# Patient Record
Sex: FEMALE | Race: WHITE
[De-identification: names, ages, dates, MRNs, and addresses within clinical notes are randomized per-mention and may not be internally consistent; named-entity substitution may affect disease eponyms.]

---

## 2018-10-30 ENCOUNTER — HOSPITAL ENCOUNTER (OUTPATIENT)
Dept: HOSPITAL 80 - BMCIMAGING | Age: 23
End: 2018-10-30
Attending: FAMILY MEDICINE
Payer: COMMERCIAL

## 2018-10-30 DIAGNOSIS — S62.322A: Primary | ICD-10-CM
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
## 2018-11-04 ENCOUNTER — HOSPITAL ENCOUNTER (OUTPATIENT)
Dept: HOSPITAL 80 - FED | Age: 23
Setting detail: OBSERVATION
LOS: 1 days | Discharge: HOME | End: 2018-11-05
Attending: FAMILY MEDICINE | Admitting: FAMILY MEDICINE
Payer: COMMERCIAL

## 2018-11-04 DIAGNOSIS — S62.302D: ICD-10-CM

## 2018-11-04 DIAGNOSIS — F41.9: ICD-10-CM

## 2018-11-04 DIAGNOSIS — F98.8: ICD-10-CM

## 2018-11-04 DIAGNOSIS — F17.200: ICD-10-CM

## 2018-11-04 DIAGNOSIS — R21: Primary | ICD-10-CM

## 2018-11-04 DIAGNOSIS — M79.641: ICD-10-CM

## 2018-11-04 PROCEDURE — G0378 HOSPITAL OBSERVATION PER HR: HCPCS

## 2018-11-04 PROCEDURE — 96374 THER/PROPH/DIAG INJ IV PUSH: CPT

## 2018-11-04 PROCEDURE — 73130 X-RAY EXAM OF HAND: CPT

## 2018-11-04 PROCEDURE — 96375 TX/PRO/DX INJ NEW DRUG ADDON: CPT

## 2018-11-04 PROCEDURE — 99285 EMERGENCY DEPT VISIT HI MDM: CPT

## 2018-11-04 NOTE — EDPHY
H & P


Stated Complaint: blisters on right hand- surgery 11/2/18


Time Seen by Provider: 11/04/18 23:37


HPI/ROS: 





HPI





CHIEF COMPLAINT:  Right hand swelling, pain, blisters, redness.





HISTORY OF PRESENT ILLNESS:  23-year-old female, had a recent right hand 

fracture.  3rd metacarpal was fractured.  On Friday she had outpatient surgery 

of this by Dr. Vanegas.  A pin was placed.  She noticed around 2:00 a.m. This 

afternoon she had notice increasing right hand swelling and redness.  This 

progressed this evening.  She now has rather large bullae blisters with yellow 

clear fluid present.  No particular purpura.  The hand is swollen, warm, red.  

Good cap refill.  Good distal pulse.





Past Medical History:  Denies medical history





Past Surgical History:ORIF right 3rd metacarpal on friday Dr. Vanegas.





Social History:  Denies drugs alcohol tobacco.





Family History:  Noncontributory








ROS   


REVIEW OF SYSTEMS:


10 Systems were reviewed and negative with the exception of the elements 

mentioned in the history of present illness.








Exam   


Constitutional   triage nursing summary reviewed, vital signs reviewed, awake/

alert. 


Eyes   normal conjunctivae and sclera, EOMI, PERRLA. 


HENT   normal inspection, atraumatic, moist mucus membranes, no epistaxis, neck 

supple/ no meningismus, no raccoon eyes. 


Respiratory   clear to auscultation bilaterally, normal breath sounds, no 

respiratory distress, no wheezing. 


Cardiovascular   rate normal, regular rhythm, no murmur, no edema, distal 

pulses normal. 


Gastrointestinal   soft, non-tender, no rebound, no guarding, normal bowel 

sounds, no distension, no pulsatile mass. 


Genitourinary   no CVA tenderness. 


Musculoskeletal right hand:  Good radial pulse, good cap refill however the 

hand is swollen diffusely, erythematous, large blisters present on the dorsum 

of the hand.  Steri-Strips in place.  Warmth and redness present.  Concern for 

infection versus allergic reaction.  The patient hand on exam is swollen, there 

approximately 3 blisters 2 in very large size, and  1 small towards the 

proximal wrist.  Surrounding erythema with a very well demarcated edge.  No 

crepitus.  She is able to move her fingers appropriately.  Without any 

significant pain there is no significant pain when I press on her hand.  No 

compartment syndrome.  Patient does not have any forearm pain or crepitus.





  no midline vertebral tenderness, full range of motion, no calf swelling, no 

tenderness of extremities, no meningismus, good pulses, neurovascularly intact.


Skin  please see above without right hand.


Neurologic   awake, alert and oriented x 3, AAOx3, moves all 4 extremities 

equally, motor intact, sensory intact, CN II-XII intact, normal cerebellar, 

normal vision, normal speech. 


Psychiatric   normal mood/affect. 


Heme/Lymph/Immune   no lymphadenopathy.





Differential Diagnosis:  Includes but is not limited to in a particular order 

hand infection, deep space hand infection, hardware infection, sepsis, 

bacteremia, necrotizing fasciitis, erysipelas, allergic reaction





Medical Decision Making:  Plan for this patient IV establishment with blood 

draw blood cultures inflammatory markers, IV Ancef 2 g, x-ray right hand.  Re-

evaluate.  I will also contact Dr. Vanegas.








Re-evaluation:








2349: consulting Dr. Vanegas. 





2359:  Spoke with Dr. Vanegas.  Reviewing the case.





1206AM: Spoke with Dr. Vanegas, reviewed Case, sent pictures to Dr. Vanegas. He 

reviewed the pictures. Would like patient admitted. NPO overnight, observed, 

possible wash-out. Allergic Reaction vs. Infection. 


Extensive discussion with Dr. Vanegas.  He would like the patient admitted to the 

hospitalist service.  Observe overnight.  He believes to be a allergic reaction 

less likely infection agrees with current plan of management this time blood 

work, blood cultures, Ancef, observation.  He did not believe the patient needs 

washout this time.  Feels that the surgery was done Friday and this would be a 

very quick presentation of possible infection.





1228AM:  Spoke with carleen Maier, with ID, he reviewed the photographs of her 

hand.  Unclear if this is allergic reaction versus infection.  He agrees with 

current management plan of blood work, blood cultures, IV Ancef.  Outlining the 

area.  Admission.  Will plan on consulting on her.





Additionally have remove the Steri-Strips to make sure there is no drainage or 

pus.





Is possible this is a contact dermatitis or allergic reaction from ChloraPrep 

swab or bupivacaine.





1240AM:  Patient Steri-Strips were removed as per this could possibly be 

causing this as well.  The incision is clean, dry and intact no evidence of 

drainage or pus.





The large blister to the proximal aspect of the wrist did rupture.  Clear to 

yellowish fluid was drained.  I did send a culture from this.





IV Ancef as been given.





Blood work has been reviewed





X-ray the right hand:  Soft tissue swelling.  There is some gas bubbles in the 

soft tissue.





Long discussed with the patient and mom at bedside.  Agree for hospital 

admission.





Here in emergency room is noted the patient is not febrile, she does not have 

an elevated white count, ESR and CRP are normal.  She does not have significant 

pain on exam.





Plan for admission obvious.  Possible allergic reaction versus infection.





Ancef 2 g Q 8 hr.





Infectious disease have been consult





Hand surgery as been consult.





Discussed case in detail with Dr. Vanegas. 


Source: Patient





- Personal History


LMP (Females 10-55): IUD In Place


Current Tetanus Diphtheria and Acellular Pertussis (TDAP): Yes


Tetanus Vaccine Date: WITHIN 10 YRS





- Medical/Surgical History


Hx Asthma: No


Hx Chronic Respiratory Disease: No


Hx Diabetes: No


Hx Cardiac Disease: No


Hx Renal Disease: No


Hx Cirrhosis: No


Hx Alcoholism: No


Hx HIV/AIDS: No


Hx Splenectomy or Spleen Trauma: No


Other PMH: med hx-adhd, BIPOLAR, anxiety.  surg-none





- Social History


Smoking Status: Current some day smoker


Constitutional: 


 Initial Vital Signs











Temperature (C)  36.8 C   11/04/18 23:20


 


Heart Rate  83   11/04/18 23:20


 


Respiratory Rate  20   11/04/18 23:20


 


Blood Pressure  111/71   11/04/18 23:20


 


O2 Sat (%)  96   11/04/18 23:20








 











O2 Delivery Mode               Room Air














Allergies/Adverse Reactions: 


 





No Known Allergies Allergy (Verified 11/04/18 23:18)


 








Home Medications: 














 Medication  Instructions  Recorded


 


Amphet Asp and D/Amphet [Adderall 20 mg PO BID@09,14 07/01/14





20 mg (*)]  


 


Doxycycline Hyclate [Vibramycin 100 mg PO BID 11/04/18





100 MG (*)]  


 


Cephalexin [Keflex (*)] 500 mg PO TID #9 cap 11/05/18


 


Ibuprofen [Motrin (*)] 200 mg PO DAILY PRN 11/05/18


 


diphenhydrAMINE [Benadryl 25 MG 25 mg PO HS 11/05/18





(*)]  














Medical Decision Making





- Data Points


Laboratory Results: 


 Laboratory Results





 11/04/18 23:50 





 11/04/18 23:50 








Microbiology Results: 


 MICROBIOLOGY





11/05/18 00:37   Hand - Other   Gram Stain - Final


11/05/18 00:37   Hand - Other   Wound Culture - Preliminary





Medications Given: 


 








Discontinued Medications





Hydrocodone Bitart/Acetaminophen (Norco 5/325)  1 - 2 tab PO Q4HRS PRN


   PRN Reason: Pain, Moderate Able to Take PO


   Stop: 11/15/18 01:10


   Last Admin: 11/05/18 02:38 Dose:  1 tab


Diphenhydramine HCl (Benadryl Injection)  25 mg IVP ONCE ONE


   Stop: 11/05/18 00:50


   Last Admin: 11/05/18 00:56 Dose:  25 mg


Hydromorphone HCl (Dilaudid)  0.5 mg IVP EDNOW ONE


   Stop: 11/04/18 23:43


   Last Admin: 11/04/18 23:59 Dose:  0.5 mg


Cefazolin Sodium/Dextrose (Ancef)  100 mls @ 200 mls/hr IV EDNOW ONE


   PRN Reason: Protocol


   Stop: 11/05/18 00:12


   Last Admin: 11/05/18 00:00 Dose:  100 mls


Sodium Chloride (Ns)  1,000 mls @ 0 mls/hr IV EDNOW ONE; Wide Open


   PRN Reason: Protocol


   Stop: 11/04/18 23:43


   Last Admin: 11/05/18 00:00 Dose:  1,000 mls


Sodium Chloride (Ns)  1,000 mls @ 75 mls/hr IV CONT EDVIN


   Stop: 05/04/19 01:14


   Last Admin: 11/05/18 07:34 Dose:  1,000 mls


Cefazolin Sodium/Dextrose (Ancef)  100 mls @ 200 mls/hr IV Q8HRS EDVIN


   PRN Reason: Protocol


   Stop: 12/05/18 07:59


   Last Admin: 11/05/18 07:34 Dose:  100 mls








Departure





- Departure


Disposition: [facility]


Clinical Impression: 


 Rash and nonspecific skin eruption





Condition: Good

## 2018-11-05 VITALS — DIASTOLIC BLOOD PRESSURE: 55 MMHG | SYSTOLIC BLOOD PRESSURE: 115 MMHG

## 2018-11-05 LAB
INR PPP: 0.89 (ref 0.83–1.16)
PLATELET # BLD: 294 10^3/UL (ref 150–400)
PLATELET # BLD: 322 10^3/UL (ref 150–400)
PROTHROMBIN TIME: 12.3 SEC (ref 12–15)

## 2018-11-05 RX ADMIN — HYDROCODONE BITARTRATE AND ACETAMINOPHEN PRN TAB: 5; 325 TABLET ORAL at 02:38

## 2018-11-05 RX ADMIN — HYDROCODONE BITARTRATE AND ACETAMINOPHEN PRN TAB: 5; 325 TABLET ORAL at 02:03

## 2018-11-05 NOTE — GDS
DISCHARGE DIAGNOSES:  

1.  Rash on right hand with large-appearing bullae, likely contact dermatitis.  

2.  Hand pain, status post a metacarpal open reduction and internal fixation.

3.  Anxiety and attention deficit disorder.



CONSULTATION:  Dr. Tyler St.



HISTORY:  Briefly, the patient is a 23-year-old female with a history of anxiety and ADD, who present
ed to the ER with right hand pain.  She is status post a metacarpal open reduction and internal fixat
ion.  She was seen and evaluated by Dr. Vanegas, had surgery.  Overall, did well with surgery.  Then, she
 had a rash with large-appearing bullae.  It is very localized.  Dr. St and I evaluated her.  He 
is questioning if she had a possible localized reaction to chlorhexidine.  She will follow up with he
r dermatologist in regard to this.  She will be discharged home on a few days of oral antibiotics.



HOSPITAL COURSE:  

1.  Rash with large-appearing bullae, likely contact dermatitis.  Recommendation to get further evalu
ation to see if she had a reaction to chlorhexidine.

2.  Status post metacarpal open reduction and internal fixation.  Further followup with Dr. Vanegas.

3.  Anxiety, ADD, stable.



DISCHARGE CONDITION:  Stable.  Blood pressure is 115/55, O2 sats on room air 96%, respiratory rate of
 20, pulse of 68, temperature 36.8.



DISCHARGE MEDICATIONS:  Please see the EMR.



DISCHARGE INSTRUCTIONS:  

1.  Further followup with her dermatologist and get a patch test to see if she is allergic to chlorhe
xidine.

2.  To follow up with Dr. Vanegas and her PCP for pending labs.  She has several pending.

3.  If she develops fever, chills, worsening hand pain, return to the ER.

4.  Elevate her hand several days above her heart to help with the swelling.



Copy requested to:

Dr. Guilherme St



Job #:  [id number]

## 2018-11-05 NOTE — GCON
INPATIENT INFECTIOUS DISEASE CONSULTATION



REFERRING PHYSICIAN:  Conrado Vanegas MD



REASON FOR REFERRAL:  A blistering erythema postoperatively around the operative site volar aspect th
e right wrist.



HISTORY OF PRESENT ILLNESS:  Patient is a 23-year-old female who suffered a fall on the stairs during
 a Halloween party and this resulted in a fracture of her right hand and wrist.  The patient went to 
surgical repair on 11/02.  She had a fixation and successful operative course.  She presented back to
 the emergency room on 11/05 with increasing erythema on larger bullae blisters on the dorsal aspect 
of her hand.  The hand was swollen, warm, and red.  There were good pulses.  The patient denied any f
odalis or other symptoms of systemic toxicity.  She was started empirically on IV cefazolin.  Today, t
he blisters have evolved and some have ruptured.  There is no significant extension of erythema.  She
 remains afebrile.



PAST MEDICAL HISTORY:  

1.  Attention deficit hyperactivity disorder.

2.  Anxiety.

3.  Possible bipolar disorder although patient states this is a misdiagnosis.



PAST SURGICAL HISTORY:  As above.



ANTIBIOTICS:  Keflex.



ALLERGIES:  No known drug allergies.



SOCIAL HISTORY:  Patient currently is a full-time student and lives with her parents.  She is a [employer] tobacco user, occasional marijuana use, occasional alcohol use as well.



FAMILY HISTORY:  Reviewed, but noncontributory.



REVIEW OF SYSTEMS:  Other than that detailed above in the present illness, comprehensive 10-system re
view is negative.



PHYSICAL EXAMINATION:  VITAL SIGNS:  Temperature maximum 36.8, temperature current 36.6, heart rate 6
8, respiratory rate is 20, blood pressure is 115/55.  GENERAL:  The patient is a well-formed, well-no
urished young female in no acute distress.  She is not toxic in appearance.  She is alert and oriente
d x3.  She is very pleasant in demeanor.  CARDIAC:  Regular rate and rhythm.  No murmur, rub, or gall
op noted.  LUNGS:  Clear to auscultation bilaterally with good effort.  SKIN:  Warm and dry to the to
uch.  No diffuse rash.  On the dorsal aspect of the right hand and wrist, patient has a surgical inci
ronel, which looks well approximated.  There is no drainage from the incision.  The overlying skin to 
the incision surrounding the incision has significant erythema and bullae.  There is sharp demarcatio
n of erythema to normal skin a number of centimeter proximal and distal to the incision area.  There 
is no circumferential stretching of the erythema or blistering to the palmar aspect.



LABORATORY DATA:  Patient has a CBC dated 11/05/2018, shows a white blood cell count of 5.75, hemoglo
bin of 12.0, hematocrit of 36.6, and platelet count of 294, differential is within normal limits.  Se
rum chemistries on 11/05/2018, are all within normal limits.  Creatinine 0.6.  Procalcitonin was unde
tectable low.



MICROBIOLOGIC DATA:  Patient has blood cultures dated 11/04/2018, which are pending.  The patient als
o has an aspirate of the bullae fluid Gram stain and culture, which are pending.



ASSESSMENT:  By physical examination, this looks much more like a contact dermatitic reaction.  The c
andidates as to exposure would be the pre-surgical preparation material, which consist of chlorhexidi
ne.  Other candidates would, include the Steri-Strip adhesives.  I think the chlorhexidine is more li
jonah considering the distance of erythema and blistering from the incision area itself.  There was an
 Ace wrap surrounding the hand, but there is no involvement of skin on the palmar aspect of the area.
  Suggested to the patient that she approach her dermatologist as an outpatient once this is resolved
 with patch testing for chlorhexidine.  In the meantime, I think it is fine to send the patient home 
with oral Keflex for a total of 3 days.  Patient can follow up with primary care and with Dr. Vanegas for
 routine surgical followup.





Job #:  [id number]

## 2018-11-05 NOTE — GHP
DATE OF ADMISSION:  11/05/2018



PRIMARY CARE PHYSICIAN:  None listed.



SOURCE:  Patient provides history, appears reliable.  EMR was reviewed and case 
discussed with ED provider.



CHIEF COMPLAINT:  Right hand pain and swelling.



HISTORY OF PRESENT ILLNESS:  This is a very pleasant 23-year-old female with 
past medical history significant for ADHD, anxiety, who presents to the 
emergency department today with complaints of right hand pain and swelling that 
started in the last 24 hours.  Patient had a fall last week on her hand.  She 
subsequently underwent a 3rd metacarpal fracture ORIF with Dr. Vanegas.  She 
reports development of right hand pain, swelling, and blisters on the dorsum of 
her hand.  She states she was prescribed doxycycline to start taking prior to 
her surgery on Friday, but she forgot and started these postoperatively when 
she went home.  She reports she has not missed any doses.  She has noticed 
increasing swelling and bullae formation.  One of them ruptured when she put 
her sweater on.  She denies any numbness, but has had a little bit of tingling 
in her hands.  She reports her pain to be 5/10, constant.  She denies any fevers
, chills.  No nausea or vomiting.  Patient without any previous history of 
abscesses or skin issues.



REVIEW OF SYSTEMS:  Ten systems reviewed and otherwise negative.



ALLERGIES:  No known drug allergies.



HOME MEDICATIONS:  Adderall and doxycycline.



PAST MEDICAL HISTORY:  Significant for ADHD and anxiety.  The patient, per chart
, has a previous diagnosis of bipolar disorder, but patient reports this was a 
misdiagnosis.



PAST SURGICAL HISTORY:  Patient right hand repair as noted above in HPI.  Also, 
wisdom tooth extraction.



FAMILY HISTORY:  Father with folliculitis, but no other family history of skin 
issues or conditions.  Maternal grandfather with history of CAD.



SOCIAL HISTORY:  Patient lives with her parents currently.  She is a full-time 
student, studying accounting.  She smokes a half pack per day of cigarettes.  
She uses occasional marijuana.  She does not drink any alcohol.



CODE STATUS:  Full.



PHYSICAL EXAM:  VITAL SIGNS:  Blood pressure is 111/71, heart rate is 83, 
respiratory rate is 20, O2 sat is 96% on room air, temperature of 36.8.  
Current vitals available:  Blood pressure 131/65, heart rate 65, respiratory 
rate 16, O2 sat is 97% on room air, temperature of 36.6.  GENERAL:  No acute 
distress, pleasant, young adult female, who is resting quietly on bed.  HEAD:  
Normocephalic, atraumatic.  EYES:  Extraocular movements are grossly intact.  
Pupils equal, round, decreased reactivity to light bilaterally, but symmetric.  
No scleral icterus or conjunctival injection.  NECK:  Supple.  Trachea midline.
  ENT:  Mucous membranes are moist.  Dentition intact.  No nasal discharge.  CV
:  Regular rate and rhythm.  No murmurs, rubs,, or gallops appreciated.  
RESPIRATORY:  Lungs are clear to auscultation bilaterally.  No wheezes, rales, 
or rhonchi appreciated.  ABDOMEN:  Positive bowel sounds, soft, nontender to 
palpation.  No rebound, guarding, or masses appreciated.  :  No suprapubic 
tenderness to palpation.  No Vance catheter in place.  EXTREMITIES:  Distal 
lower extremities without any cyanosis, clubbing, or edema appreciated.  
Patient has 2+ pedal pulses.  Upper extremities:  Right hand is wrapped in 
gauze bandage.  It is removed and patient does have irregularly patterned, 
expanding areas of bullae with serous fluid.  She has an approximated incision 
over the 3rd digit without any purulent drainage.  Sutures appear to be intact.
  Tenderness to palpation.  All of her fingers and her thumb are all swollen.  
She has adequate cap refill.  Sensation is intact.  NEURO:  Grossly nonfocal.  
Sensation in her hand as noted above.  Remainder of neurologic exam is 
otherwise normal.  PSYCH:  The patient's thought process, content, and 
questions are appropriate.  She is pleasant and cooperative.



LABORATORY STUDIES:  

1.  WBC 8.06, H and H 13.2 and 39.6, MCV of 88.8, platelet count is 322.  No 
bands.  ESR of 3.  

2.  PT is 12.3, INR is 0.89, PTT is 28.9.

3.  VBG:  Lactic acid 1.3.

4.  Sodium is 142, potassium 4.5, chloride 107, CO2 of 25, anion gap of 10, BUN 
13, creatinine 0.6, GFR greater than 60.  Glucose is 95, calcium is 9.3.  CRP 
is less than 5.  Procalcitonin is negative of less than 0.02.  

5.  Blood cultures and Gram stain are pending.

6.  Hand x-ray:  Image reviewed myself.  Report is still pending.  Soft tissue 
swelling in the right hand.  Hardware noted in the 3rd metacarpal.  
Subcutaneous air is noted on lateral view.



ASSESSMENT AND PLAN:  The patient is a 23-year-old female with history of 
anxiety, attention deficit hyperactivity disorder, who presents to the 
emergency department today with complaints of right hand pain, status post 3rd 
metacarpal open reduction, infernal fixation.

1.  Rash:  Patient with large-appearing bullae with serous fluid.  One of these 
lesions did rupture and a culture was obtained, and sent for Gram stain.  Blood 
cultures also obtained.  The patient is afebrile.  She has no leukocytosis.  
There is concern that her rash is related to an allergic reaction or dermatitis 
from contact with either the chlorhexidine wash, Steri-Strips or glue, or 
bandaging.  Infectious Disease was consulted from the ED.  They do recommend at 
this time cover empirically with cefazolin, which we will continue 2 g q.8 
hours.  They will see the patient in the morning as well as orthopedic service.

2.  Acute pain:  P.r.n. Norco and morphine, if severe.

3.  Chronic medical issues:  Anxiety, attention deficit hyperactivity disorder.
  Resume patient's Adderall at discharge.  Ativan will be available p.r.n. for 
anxiety and sleep.

4.  Fluid, Electrolyte, Nutrition:  Normal saline ordered for overnight.  
Patient will be made n.p.o. pending Orthopedic evaluation tomorrow.  
Electrolytes will be monitored and replaced if needed.

5.  Prophylaxis:  SCDs.  Holding anticoagulation pending Ortho evaluation.

6.  Code status is full.

7.  Disposition:  Patient admitted to observation status on the medical floor 
at this time pending recommendations from the specialty services.  The patient 
may possibly be discharged if this is considered an allergic reaction or her 
symptoms start to improve.





Job #:  [id number]

MTDD

## 2018-11-05 NOTE — HOSPPROG
Hospitalist Progress Note


Assessment/Plan: 





                                           


Stefanie is a 24 y/o female w hx of anxiety, ADD, who presented to the ER with 

right hand pain s/p metacarpal open reduction, internal fixation.First encounter

, chart reviewed.





*rash w large appearing bullae


-cefazolin


-met w the patient w Dr St, poss localized reaction to chlorhexidine


-dc home on 3 days of Keflex w f/u with her dermatologist and Dr Vanegas





*s/p right hand pain


-s/p metacarpal open reduction, internal fixation





*anxiety, ADD


-Adderall





*plan: dc home








Subjective: Stefanie says her hand pain is better.


Objective: 


 Vital Signs











Temp Pulse Resp BP Pulse Ox


 


 36.6 C   68   20   115/55 L  96 


 


 11/05/18 08:00  11/05/18 08:00  11/05/18 08:00  11/05/18 08:00  11/05/18 08:00








 Laboratory Results





 11/05/18 04:23 





 11/05/18 04:23 





 











 11/04/18 11/05/18 11/06/18





 05:59 05:59 05:59


 


Intake Total  1000 


 


Balance  1000 








 











PT  12.3 SEC (12.0-15.0)   11/04/18  23:50    


 


INR  0.89  (0.83-1.16)   11/04/18  23:50    














- Physical Exam


Constitutional: no apparent distress, appears nourished, not in pain


Eyes: PERRL


Ears, Nose, Mouth, Throat: hearing normal


Cardiovascular: regular rate and rhythym


Respiratory: no respiratory distress


Skin: warm, other (bullae on top of r hand, area with some erythema at wrist 

area, ecchymosis on the palmar side, swelling in fingers and hand)


Neurologic: AAOx3


Psychiatric: interacting appropriately





ICD10 Worksheet


Patient Problems: 


 Problems











Problem Status Onset


 


Rash and nonspecific skin eruption Acute  














- ICD10 Problem Qualifiers


(1) Rash and nonspecific skin eruption

## 2018-11-27 ENCOUNTER — HOSPITAL ENCOUNTER (OUTPATIENT)
Dept: HOSPITAL 80 - BMCIMAGING | Age: 23
End: 2018-11-27
Attending: ORTHOPAEDIC SURGERY
Payer: COMMERCIAL

## 2018-11-27 DIAGNOSIS — S62.322D: Primary | ICD-10-CM

## 2018-12-19 ENCOUNTER — HOSPITAL ENCOUNTER (OUTPATIENT)
Dept: HOSPITAL 80 - BMCIMAGING | Age: 23
End: 2018-12-19
Attending: ORTHOPAEDIC SURGERY
Payer: COMMERCIAL

## 2018-12-19 DIAGNOSIS — S62.322D: Primary | ICD-10-CM
